# Patient Record
Sex: FEMALE | Race: WHITE
[De-identification: names, ages, dates, MRNs, and addresses within clinical notes are randomized per-mention and may not be internally consistent; named-entity substitution may affect disease eponyms.]

---

## 2017-01-24 ENCOUNTER — HOSPITAL ENCOUNTER (OUTPATIENT)
Dept: HOSPITAL 61 - EKG | Age: 27
Discharge: HOME | End: 2017-01-24
Attending: OBSTETRICS & GYNECOLOGY
Payer: COMMERCIAL

## 2017-01-24 DIAGNOSIS — R00.2: Primary | ICD-10-CM

## 2017-01-24 PROCEDURE — 93005 ELECTROCARDIOGRAM TRACING: CPT

## 2017-01-24 NOTE — EKG
[facility]

              [address] [place] [address]

Test Date:    2017               Test Time:    12:43:20

Pat Name:     WIL ZUNIGA              Department:   

Patient ID:   [id number]           Room:          

Gender:       F                        Technician:   NOHELIA

:          1990               Requested By: CARMITA GOODE

Order Number: [id number]            Reading MD:   Jean Claude Gonsalves

                                 Measurements

Intervals                              Axis          

Rate:         78                       P:            38

NV:           178                      QRS:          70

QRSD:         76                       T:            18

QT:           362                                    

QTc:          416                                    

                           Interpretive Statements

SINUS RHYTHM



Electronically Signed On 2017 13:06:14 CST by Jean Claude Gonsalves

## 2017-02-22 ENCOUNTER — HOSPITAL ENCOUNTER (OUTPATIENT)
Dept: HOSPITAL 61 - ECHO | Age: 27
Discharge: HOME | End: 2017-02-22
Attending: INTERNAL MEDICINE
Payer: COMMERCIAL

## 2017-02-22 DIAGNOSIS — R00.2: ICD-10-CM

## 2017-02-22 DIAGNOSIS — R07.9: Primary | ICD-10-CM

## 2017-02-22 DIAGNOSIS — R06.02: ICD-10-CM

## 2017-02-22 DIAGNOSIS — Z3A.15: ICD-10-CM

## 2017-02-22 PROCEDURE — 93225 XTRNL ECG REC<48 HRS REC: CPT

## 2017-02-22 PROCEDURE — 93306 TTE W/DOPPLER COMPLETE: CPT

## 2017-02-22 PROCEDURE — 93226 XTRNL ECG REC<48 HR SCAN A/R: CPT

## 2017-02-23 NOTE — CARD
--------------- APPROVED REPORT --------------





EXAM: Two-dimensional and M-mode echocardiogram with Doppler and color Doppler.



Other Information 

Quality : GoodHR: 81bpm

Rhythm : NSR



INDICATION

Palpitations 

Dyspnea 



2D DIMENSIONS 

RVDd3.2 (2.9-3.5cm)Left Atrium(2D)3.2 (1.6-4.0cm)

IVSd0.8 (0.7-1.1cm)Aortic Root(2D)2.4 (2.0-3.7cm)

LVDd5.0 (3.9-5.9cm)LVOT Diameter2.0 (1.8-2.4cm)

PWd0.9 (0.7-1.1cm)LVDs3.5 (2.5-4.0cm)

FS (%) 30.7 %SV69.0 ml

LVEF(%)58.1 (>50%)



Aortic Valve

AoV Peak Kunal.141.0cm/sAoV VTI26.9cm

AO Peak GR.7.9mmHgLVOT Peak Kunal.92.7cm/s

LVOT  VTI 19.29cmAO Mean GR.5mmHg

QASIM (VMAX)2.67zq5DMB   (VTI)2.31cm2



Mitral Valve

MV E Ixwybnno04.2cm/sMV DECEL [id number]

MV A Lgyjoslr08.8cm/sMV E Mean Gr.2mmHg

MV PDW01tiX/A  Ratio1.8

MV A Vuwasjtc747szUBC (PHT)3.36cm2



TDI

E/Lateral E'4.9E/Medial E'7.4



Pulmonary Valve

PV Peak Hzvengcu411.7cm/sPV Peak Grad.8mmHg

RVOT VTI15.0cm



Tricuspid Valve

TR P. Hauapprj732rc/sRAP SSMJTZIV1wsPg

TR Peak Gr.97gnBsVLMY24mdNm



Pulmonary Vein

S1 Jjqqhinc65.2cm/sD2 Msojyvtw21.6cm/s

PVa sklxptop331pofq



 LEFT VENTRICLE 

The left ventricle is normal size. There is normal left ventricular wall thickness. Left ventricle sy
stolic function is normal. The Ejection Fraction is 55-60%. There is normal LV segmental wall motion.
 The left ventricular diastolic function and filling is normal .



 RIGHT VENTRICLE 

The right ventricle is normal size. There is normal right ventricular wall thickness. The right ventr
icular systolic function is normal.



 ATRIA 

The left atrium size is normal. The right atrium size is normal. The interatrial septum is intact wit
h no evidence for an atrial septal defect or patent foramen ovale as noted on 2-D or Doppler imaging.




 AORTIC VALVE 

The aortic valve is normal in structure and function. The aortic valve is trileaflet. Doppler and Col
or Flow revealed no significant aortic regurgitation. There is no significant aortic valvular stenosi
s.



 MITRAL VALVE 

The mitral valve is normal in structure and function. There is no evidence of mitral valve prolapse. 
There is no mitral valve stenosis. Doppler and Color Flow revealed trace mitral regurgitation.



 TRICUSPID VALVE 

The tricuspid valve is normal in structure. Doppler and Color Flow revealed mild tricuspid regurgitat
ion. There is no pulmonary hypertension. The PA pressure was estimated at 29 mmHg. There is no tricus
pid valve stenosis.



 PULMONIC VALVE 

The pulmonary valve is normal in structure. Doppler and Color Flow revealed trace pulmonic valvular r
egurgitation. There is no pulmonic valvular stenosis.



 GREAT VESSELS 

The aortic root is normal in size. The ascending aorta is normal in size. Normal pulmonary venous javi
w (Doppler). The IVC is dilated and collapses >50% with inspiration.



 PERICARDIAL EFFUSION 

There is no evidence of significant pericardial effusion.



Critical Notification

Critical Value: No



<Conclusion>

The left ventricle is normal size.

There is normal left ventricular wall thickness.

Left ventricle systolic function is normal.

The Ejection Fraction is 55-60%.

The left ventricular diastolic function and filling is normal .

There is no evidence of significant pericardial effusion.

There is no mitral valve stenosis.

Doppler and Color Flow revealed trace mitral regurgitation.

The left atrium is of a normal size

The righr ventricle is of a normal size with normal systolic function

Doppler and Color Flow revealed mild tricuspid regurgitation.

There is no pulmonary hypertension.

The PA pressure was estimated at 29 mmHg.

Doppler and Color Flow revealed trace pulmonic valvular regurgitation.

## 2017-02-23 NOTE — EKG
[facility]

                    [address] [place] [address]

Test Date:    2017               Test Time:    10:46:00

Pat Name:     WIL ZUNIGA              Department:   

Patient ID:   [id number]           Room:          

Gender:       F                        Technician:   

:          1990               Requested By: MASHA GREENE

Order Number: [id number]            Reading MD:     

                           Interpretive Statements

## 2017-10-05 ENCOUNTER — HOSPITAL ENCOUNTER (EMERGENCY)
Dept: HOSPITAL 63 - ER | Age: 27
Discharge: HOME | End: 2017-10-05
Payer: COMMERCIAL

## 2017-10-05 VITALS — SYSTOLIC BLOOD PRESSURE: 105 MMHG | DIASTOLIC BLOOD PRESSURE: 59 MMHG

## 2017-10-05 DIAGNOSIS — Z98.890: ICD-10-CM

## 2017-10-05 DIAGNOSIS — D64.9: ICD-10-CM

## 2017-10-05 DIAGNOSIS — N93.8: Primary | ICD-10-CM

## 2017-10-05 DIAGNOSIS — F41.9: ICD-10-CM

## 2017-10-05 LAB
ALBUMIN SERPL-MCNC: 3.4 G/DL (ref 3.4–5)
ALBUMIN/GLOB SERPL: 1.1 {RATIO} (ref 1–1.7)
ALP SERPL-CCNC: 51 U/L (ref 46–116)
ALT SERPL-CCNC: 29 U/L (ref 14–59)
ANION GAP SERPL CALC-SCNC: 5 MMOL/L (ref 6–14)
AST SERPL-CCNC: 18 U/L (ref 15–37)
BASOPHILS # BLD AUTO: 0 X10^3/UL (ref 0–0.2)
BASOPHILS NFR BLD: 1 % (ref 0–3)
BILIRUB SERPL-MCNC: 0.3 MG/DL (ref 0.2–1)
BUN/CREAT SERPL: 14 (ref 6–20)
CA-I SERPL ISE-MCNC: 11 MG/DL (ref 7–20)
CALCIUM SERPL-MCNC: 8.8 MG/DL (ref 8.5–10.1)
CHLORIDE SERPL-SCNC: 106 MMOL/L (ref 98–107)
CO2 SERPL-SCNC: 30 MMOL/L (ref 21–32)
CREAT SERPL-MCNC: 0.8 MG/DL (ref 0.6–1)
EOSINOPHIL NFR BLD: 0.2 X10^3/UL (ref 0–0.7)
EOSINOPHIL NFR BLD: 4 % (ref 0–3)
ERYTHROCYTE [DISTWIDTH] IN BLOOD BY AUTOMATED COUNT: 13.2 % (ref 11.5–14.5)
GFR SERPLBLD BASED ON 1.73 SQ M-ARVRAT: 84.8 ML/MIN
GLOBULIN SER-MCNC: 3.1 G/DL (ref 2.2–3.8)
GLUCOSE SERPL-MCNC: 80 MG/DL (ref 70–99)
HCT VFR BLD CALC: 36.3 % (ref 36–47)
HGB BLD-MCNC: 12.4 G/DL (ref 12–15.5)
LYMPHOCYTES # BLD: 1.9 X10^3/UL (ref 1–4.8)
LYMPHOCYTES NFR BLD AUTO: 32 % (ref 24–48)
MCH RBC QN AUTO: 31 PG (ref 25–35)
MCHC RBC AUTO-ENTMCNC: 34 G/DL (ref 31–37)
MCV RBC AUTO: 90 FL (ref 79–100)
MONO #: 0.4 X10^3/UL (ref 0–1.1)
MONOCYTES NFR BLD: 7 % (ref 0–9)
NEUT #: 3.3 X10^3UL (ref 1.8–7.7)
NEUTROPHILS NFR BLD AUTO: 57 % (ref 31–73)
PLATELET # BLD AUTO: 195 X10^3/UL (ref 140–400)
POTASSIUM SERPL-SCNC: 3.5 MMOL/L (ref 3.5–5.1)
PROT SERPL-MCNC: 6.5 G/DL (ref 6.4–8.2)
RBC # BLD AUTO: 4.03 X10^6/UL (ref 3.5–5.4)
SODIUM SERPL-SCNC: 141 MMOL/L (ref 136–145)
WBC # BLD AUTO: 5.9 X10^3/UL (ref 4–11)

## 2017-10-05 PROCEDURE — 87591 N.GONORRHOEAE DNA AMP PROB: CPT

## 2017-10-05 PROCEDURE — 86850 RBC ANTIBODY SCREEN: CPT

## 2017-10-05 PROCEDURE — 96361 HYDRATE IV INFUSION ADD-ON: CPT

## 2017-10-05 PROCEDURE — 96372 THER/PROPH/DIAG INJ SC/IM: CPT

## 2017-10-05 PROCEDURE — 96374 THER/PROPH/DIAG INJ IV PUSH: CPT

## 2017-10-05 PROCEDURE — 99285 EMERGENCY DEPT VISIT HI MDM: CPT

## 2017-10-05 PROCEDURE — 86901 BLOOD TYPING SEROLOGIC RH(D): CPT

## 2017-10-05 PROCEDURE — 96375 TX/PRO/DX INJ NEW DRUG ADDON: CPT

## 2017-10-05 PROCEDURE — 36415 COLL VENOUS BLD VENIPUNCTURE: CPT

## 2017-10-05 PROCEDURE — 80053 COMPREHEN METABOLIC PANEL: CPT

## 2017-10-05 PROCEDURE — 86900 BLOOD TYPING SEROLOGIC ABO: CPT

## 2017-10-05 PROCEDURE — 87491 CHLMYD TRACH DNA AMP PROBE: CPT

## 2017-10-05 PROCEDURE — 85025 COMPLETE CBC W/AUTO DIFF WBC: CPT

## 2017-10-05 PROCEDURE — 76856 US EXAM PELVIC COMPLETE: CPT

## 2017-10-05 PROCEDURE — 76830 TRANSVAGINAL US NON-OB: CPT

## 2017-10-05 RX ADMIN — MORPHINE SULFATE PRN MG: 4 INJECTION, SOLUTION INTRAMUSCULAR; INTRAVENOUS at 16:35

## 2017-10-05 RX ADMIN — MORPHINE SULFATE PRN MG: 4 INJECTION, SOLUTION INTRAMUSCULAR; INTRAVENOUS at 17:02

## 2017-10-05 NOTE — ED.ADGEN
Past History


Past Medical History:  No Pertinent History


Past Medical History


Asthma as a child, anxiety, restless leg syndrome


Past Surgical History:  , Tonsillectomy


Past Surgical History


 2, tonsillectomy with adenoidectomy, ORIF arm fracture, oral surgery


Smoking:  Quit Greater Than 1 Year


Alcohol Use:  None


Drug Use:  None





Adult General


HPI


HPI





This is a pleasant 27-year-old female who presents emergency Department with a 

chief complaint of vaginal bleeding. The patient is 9 weeks postpartum from a C-

section. Patient states she had her last follow-up exam with her OB/GYN 2 weeks 

ago and was allowed to return back to normal activities. The patient presents 

that she had sex 5 days ago for the first time. The patient reports that she's 

had vaginal bleeding greater than 2 pads per hour for the past 3 days. The 

patient reports that she is feeling weak and tired easily. The patient reports 

that the infant is sleeping well and that that is giving her reasonable sleep. 

She does understand why she is so tired. The patient reports that she is a C-

section on 2017. Dr. márquez is her OB/GYN. The patient taking Motrin 

and Tylenol as needed for pain since she was taken off of the Percocet that she 

is initially on for her . The patient also takes Zoloft for anxiety 

and referral for progressive leg syndrome. The patient reports feeling more 

anxious the delivery of her child. The patient denies suicidal or homicidal 

ideation.





Review of Systems


Review of Systems





Constitutional: Denies fever or chills []


Eyes: Denies change in visual acuity, redness, or eye pain []


HENT: Denies nasal congestion or sore throat []


Respiratory: Denies cough or shortness of breath []


Cardiovascular: No additional information not addressed in HPI []


GI: Denies abdominal pain, nausea, vomiting, bloody stools or diarrhea []


: Denies dysuria or hematuria []


Musculoskeletal: Denies back pain or joint pain []


Integument: Denies rash or skin lesions []


Neurologic: Denies headache, focal weakness or sensory changes []


Endocrine: Denies polyuria or polydipsia []





Current Medications


Current Medications





Current Medications








 Medications


  (Trade)  Dose


 Ordered  Sig/Ming  Start Time


 Stop Time Status Last Admin


Dose Admin


 


 Ketorolac


 Tromethamine


  (Toradol)  30 mg  1X  ONCE  10/5/17 15:15


 10/5/17 15:16 DC 10/5/17 15:14


30 MG


 


 Morphine Sulfate


  (Morphine 4mg


 Syringe)  4 mg  PRN Q15MIN  PRN  10/5/17 16:30


 10/5/17 17:25 DC 10/5/17 17:02


4 MG


 


 Sodium Chloride  1,000 ml @ 


 1,000 mls/hr  1X  ONCE  10/5/17 15:15


 10/5/17 16:14 DC 10/5/17 15:13


1,000 MLS/HR











Allergies


Allergies





Allergies








Coded Allergies Type Severity Reaction Last Updated Verified


 


  No Known Drug Allergies    16 No











Physical Exam


Physical Exam





Constitutional: Well developed, well nourished, no acute distress, non-toxic 

appearance. []


HENT: Normocephalic, atraumatic, bilateral external ears normal, oropharynx 

moist, no oral exudates, nose normal. []


Eyes: PERRLA, EOMI, conjunctiva normal, no discharge. [] 


Neck: Normal range of motion, no tenderness, supple, no stridor. [] 


Cardiovascular:Heart rate regular rhythm, no murmur []


Lungs & Thorax:  Bilateral breath sounds clear to auscultation []


Abdomen: Bowel sounds normal, soft, no tenderness, no masses, no pulsatile 

masses. [] 


Skin: Warm, dry, no erythema, no rash. [] 


Back: No tenderness, no CVA tenderness. 


 exam: performed with the nurse as my chaperone, normal external genitalia, 

minimal vaginal bleeding less than that expected for a period, no blood clots 

in vagina, no cervical motion tenderness, no adenexal tenderness, no adenexal 

mass, no uterine mass, no vaginal discharge[] 


Extremities: No tenderness, no cyanosis, no clubbing, ROM intact, no edema. [] 


Neurologic: Alert and oriented X 3, normal motor function, normal sensory 

function, no focal deficits noted. []


Psychologic: Affect normal, judgement normal, mood normal. []





Current Patient Data


Vital Signs





 Vital Signs








  Date Time  Temp Pulse Resp B/P (MAP) Pulse Ox O2 Delivery O2 Flow Rate FiO2


 


10/5/17 17:22  82 18 105/59 (74) 99 Room Air  


 


10/5/17 14:30 98.2       








Lab Results





 Laboratory Tests








Test


  10/5/17


15:00


 


White Blood Count


  5.9 x10^3/uL


(4.0-11.0)


 


Red Blood Count


  4.03 x10^6/uL


(3.50-5.40)


 


Hemoglobin


  12.4 g/dL


(12.0-15.5)


 


Hematocrit


  36.3 %


(36.0-47.0)


 


Mean Corpuscular Volume


  90 fL ()


 


 


Mean Corpuscular Hemoglobin 31 pg (25-35)  


 


Mean Corpuscular Hemoglobin


Concent 34 g/dL


(31-37)


 


Red Cell Distribution Width


  13.2 %


(11.5-14.5)


 


Platelet Count


  195 x10^3/uL


(140-400)


 


Neutrophils (%) (Auto) 57 % (31-73)  


 


Lymphocytes (%) (Auto) 32 % (24-48)  


 


Monocytes (%) (Auto) 7 % (0-9)  


 


Eosinophils (%) (Auto) 4 % (0-3)  H


 


Basophils (%) (Auto) 1 % (0-3)  


 


Neutrophils # (Auto)


  3.3 x10^3uL


(1.8-7.7)


 


Lymphocytes # (Auto)


  1.9 x10^3/uL


(1.0-4.8)


 


Monocytes # (Auto)


  0.4 x10^3/uL


(0.0-1.1)


 


Eosinophils # (Auto)


  0.2 x10^3/uL


(0.0-0.7)


 


Basophils # (Auto)


  0.0 x10^3/uL


(0.0-0.2)


 


Sodium Level


  141 mmol/L


(136-145)


 


Potassium Level


  3.5 mmol/L


(3.5-5.1)


 


Chloride Level


  106 mmol/L


()


 


Carbon Dioxide Level


  30 mmol/L


(21-32)


 


Anion Gap 5 (6-14)  L


 


Blood Urea Nitrogen


  11 mg/dL


(7-20)


 


Creatinine


  0.8 mg/dL


(0.6-1.0)


 


Estimated GFR


(Cockcroft-Gault) 84.8  


 


 


BUN/Creatinine Ratio 14 (6-20)  


 


Glucose Level


  80 mg/dL


(70-99)


 


Calcium Level


  8.8 mg/dL


(8.5-10.1)


 


Total Bilirubin


  0.3 mg/dL


(0.2-1.0)


 


Aspartate Amino Transferase


(AST) 18 U/L (15-37)


 


 


Alanine Aminotransferase (ALT)


  29 U/L (14-59)


 


 


Alkaline Phosphatase


  51 U/L


()


 


Total Protein


  6.5 g/dL


(6.4-8.2)


 


Albumin


  3.4 g/dL


(3.4-5.0)


 


Albumin/Globulin Ratio 1.1 (1.0-1.7)  








Microbiology


10/5/17 Wet Prep - Final, Complete


          





 Laboratory Tests








Test


  10/5/17


15:00


 


White Blood Count


  5.9 x10^3/uL


(4.0-11.0)


 


Red Blood Count


  4.03 x10^6/uL


(3.50-5.40)


 


Hemoglobin


  12.4 g/dL


(12.0-15.5)


 


Hematocrit


  36.3 %


(36.0-47.0)


 


Mean Corpuscular Volume


  90 fL ()


 


 


Mean Corpuscular Hemoglobin 31 pg (25-35)  


 


Mean Corpuscular Hemoglobin


Concent 34 g/dL


(31-37)


 


Red Cell Distribution Width


  13.2 %


(11.5-14.5)


 


Platelet Count


  195 x10^3/uL


(140-400)


 


Neutrophils (%) (Auto) 57 % (31-73)  


 


Lymphocytes (%) (Auto) 32 % (24-48)  


 


Monocytes (%) (Auto) 7 % (0-9)  


 


Eosinophils (%) (Auto) 4 % (0-3)  H


 


Basophils (%) (Auto) 1 % (0-3)  


 


Neutrophils # (Auto)


  3.3 x10^3uL


(1.8-7.7)


 


Lymphocytes # (Auto)


  1.9 x10^3/uL


(1.0-4.8)


 


Monocytes # (Auto)


  0.4 x10^3/uL


(0.0-1.1)


 


Eosinophils # (Auto)


  0.2 x10^3/uL


(0.0-0.7)


 


Basophils # (Auto)


  0.0 x10^3/uL


(0.0-0.2)


 


Sodium Level


  141 mmol/L


(136-145)


 


Potassium Level


  3.5 mmol/L


(3.5-5.1)


 


Chloride Level


  106 mmol/L


()


 


Carbon Dioxide Level


  30 mmol/L


(21-32)


 


Anion Gap 5 (6-14)  L


 


Blood Urea Nitrogen


  11 mg/dL


(7-20)


 


Creatinine


  0.8 mg/dL


(0.6-1.0)


 


Estimated GFR


(Cockcroft-Gault) 84.8  


 


 


BUN/Creatinine Ratio 14 (6-20)  


 


Glucose Level


  80 mg/dL


(70-99)


 


Calcium Level


  8.8 mg/dL


(8.5-10.1)


 


Total Bilirubin


  0.3 mg/dL


(0.2-1.0)


 


Aspartate Amino Transferase


(AST) 18 U/L (15-37)


 


 


Alanine Aminotransferase (ALT)


  29 U/L (14-59)


 


 


Alkaline Phosphatase


  51 U/L


()


 


Total Protein


  6.5 g/dL


(6.4-8.2)


 


Albumin


  3.4 g/dL


(3.4-5.0)


 


Albumin/Globulin Ratio 1.1 (1.0-1.7)  








Microbiology


10/5/17 Wet Prep - Final, Complete





EKG


EKG


[]





Radiology/Procedures


Radiology/Procedures


[facility] [address], [place] [address] [phone]


 


 IMAGING REPORT





 Signed





PATIENT: KRISH GARRETT ACCOUNT: [id number] MRN#: [id number]


: 1990 LOCATION: ER AGE: 27


SEX: F EXAM DT: 10/05/17 ACCESSION#: [id number]


STATUS: REG ER ORD. PHYSICIAN: MISHA MEJIA MD 


REASON: bleeding 9 weeks after c section concern for retained products of


PROCEDURE: US PELVIS W/TV





Pelvic ultrasound, 10/5/2017:





History: Postpartum bleeding





Transabdominal and transvaginal scans were obtained. The uterus is


retroverted. It is within normal limits in size. The central uterine echo


complex measures only 6 mm in greatest AP dimension. No significant retained


products of conception are evident. There is a tiny amount of fluid in the


central uterine cavity. The ovaries are within normal limits in size. They


contain small follicular cysts. No adnexal mass is seen. There is a small to


moderate amount of free fluid in the pelvis.





IMPRESSION:


1. Tiny amount of fluid in the central uterine cavity.


2. Small to moderate volume of free fluid in the pelvis.














DICTATED AND SIGNED BY:     MORITZ,RICK S MD


DATE:     10/05/17 4642





CC: ENRIQUETA ABDUL MD; MISHA MEJIA MD ~


[]





Course & Med Decision Making


Course & Med Decision Making


Pertinent Labs and Imaging studies reviewed. (See chart for details)





This is a pleasant 27-year-old female who presents immersed department with 

ongoing vaginal bleeding following a  delivery 9 weeks ago. The patient

's bleeding on exam was very minimal. The patient had minimal bleeding 

throughout her emergency department stay. Patient's hemogram agree stable 

throughout her emergency department stay. The patient's ultrasound did not show 

any signs for retained products of conception or other abnormalities. I 

discussed the patient the need for close follow-up patient verbalizes 

understanding and agrees to plan for outpatient follow-up. I recommended over-

the-counter iron tablets or prenatal vitamins to help the patient maintained 

her iron levels. She verbalized understanding and agreed with this plan.[]





Final Impression


Final Impression


Dysfunctional uterine bleeding, mild anemia[]


Problems:  





Dragon Disclaimer


Dragon Disclaimer


This electronic medical record was generated, in whole or in part, using a 

voice recognition dictation system.





Departure


Departure:


Impression:  


 Primary Impression:  


 Dysfunctional uterine bleeding


 Additional Impression:  


 Anemia


Disposition:  01 HOME, SELF-CARE


Condition:  GOOD


Patient Instructions:  Uterine Bleeding, Dysfunctional





Additional Instructions:  


The patient is referred back to Dr. Feliciano for follow-up.











MISHA MEJIA MD Oct 5, 2017 14:46

## 2017-10-05 NOTE — RAD
Pelvic ultrasound, 10/5/2017:



History: Postpartum bleeding



Transabdominal and transvaginal scans were obtained. The uterus is

retroverted. It is within normal limits in size. The central uterine echo

complex measures only 6 mm in greatest AP dimension. No significant retained

products of conception are evident. There is a tiny amount of fluid in the

central uterine cavity. The ovaries are within normal limits in size. They

contain small follicular cysts. No adnexal mass is seen. There is a small to

moderate amount of free fluid in the pelvis.



IMPRESSION:

1. Tiny amount of fluid in the central uterine cavity.

2. Small to moderate volume of free fluid in the pelvis.

## 2017-12-09 ENCOUNTER — HOSPITAL ENCOUNTER (EMERGENCY)
Dept: HOSPITAL 63 - ER | Age: 27
Discharge: HOME | End: 2017-12-09
Payer: COMMERCIAL

## 2017-12-09 VITALS — WEIGHT: 151.24 LBS | HEIGHT: 63 IN | BODY MASS INDEX: 26.8 KG/M2

## 2017-12-09 VITALS — SYSTOLIC BLOOD PRESSURE: 105 MMHG | DIASTOLIC BLOOD PRESSURE: 59 MMHG

## 2017-12-09 DIAGNOSIS — Z53.21: ICD-10-CM

## 2017-12-09 DIAGNOSIS — R10.9: Primary | ICD-10-CM

## 2018-03-18 ENCOUNTER — HOSPITAL ENCOUNTER (EMERGENCY)
Dept: HOSPITAL 63 - ER | Age: 28
Discharge: HOME | End: 2018-03-18
Payer: COMMERCIAL

## 2018-03-18 VITALS — DIASTOLIC BLOOD PRESSURE: 76 MMHG | SYSTOLIC BLOOD PRESSURE: 138 MMHG

## 2018-03-18 DIAGNOSIS — Z87.891: ICD-10-CM

## 2018-03-18 DIAGNOSIS — N76.0: ICD-10-CM

## 2018-03-18 DIAGNOSIS — Z87.440: ICD-10-CM

## 2018-03-18 DIAGNOSIS — B96.89: ICD-10-CM

## 2018-03-18 DIAGNOSIS — M54.5: Primary | ICD-10-CM

## 2018-03-18 LAB
APTT PPP: YELLOW S
BACTERIA #/AREA URNS HPF: (no result) /HPF
BILIRUB UR QL STRIP: (no result)
FIBRINOGEN PPP-MCNC: CLEAR MG/DL
GLUCOSE UR STRIP-MCNC: (no result) MG/DL
NITRITE UR QL STRIP: (no result)
RBC #/AREA URNS HPF: 0 /HPF (ref 0–2)
SP GR UR STRIP: 1.01
SQUAMOUS #/AREA URNS LPF: (no result) /LPF
UROBILINOGEN UR-MCNC: 0.2 MG/DL
WBC #/AREA URNS HPF: (no result) /HPF (ref 0–4)

## 2018-03-18 PROCEDURE — 81001 URINALYSIS AUTO W/SCOPE: CPT

## 2018-03-18 PROCEDURE — 99283 EMERGENCY DEPT VISIT LOW MDM: CPT

## 2018-03-18 PROCEDURE — 81025 URINE PREGNANCY TEST: CPT

## 2018-03-18 NOTE — PHYS DOC
Past History


Past Medical History:  UTI, Other


Past Surgical History:  , Tonsillectomy, Tubal ligation


Smoking:  Quit Greater Than 1 Year


Alcohol Use:  Rarely


Drug Use:  None





Adult General


Chief Complaint


Chief Complaint:  PAIN ON URINATION





[facility]


HPI





Patient is a 27 year old female who presents with pain in the left back left 

hip. She states been hurting for last 3-4 days. She's been trying Tylenol and 

ibuprofen without any relief. She states it hurts more when she tries to bend 

over and  her son. She states when she's had this pain in the past and 

has had a bladder kidney infection. She denies any nausea vomiting diarrhea. 

She denies any abdominal pain.





Review of Systems


Review of Systems





Constitutional: Denies fever or chills []


Eyes: Denies change in visual acuity, redness, or eye pain []


HENT: Denies nasal congestion or sore throat []


Respiratory: Denies cough or shortness of breath []


Cardiovascular: No additional information not addressed in HPI []


GI: Denies abdominal pain, nausea, vomiting, bloody stools or diarrhea []


: Denies dysuria or hematuria []


Musculoskeletal:  Positive for back pain, Denies joint pain []


Integument: Denies rash or skin lesions []


Neurologic: Denies headache, focal weakness or sensory changes []


Endocrine: Denies polyuria or polydipsia []





All other systems were reviewed and found to be within normal limits, except as 

documented in this note.





Allergies


Allergies





Allergies








Coded Allergies Type Severity Reaction Last Updated Verified


 


  No Known Drug Allergies    16 No











Physical Exam


Physical Exam





Constitutional: Well developed, well nourished, no acute distress, non-toxic 

appearance. []


HENT: Normocephalic, atraumatic, bilateral external ears normal, oropharynx 

moist, no oral exudates, nose normal. []


Eyes: PERRLA, EOMI, conjunctiva normal, no discharge. [] 


Neck: Normal range of motion, no tenderness, supple, no stridor. [] 


Cardiovascular:Heart rate regular rhythm, no murmur []


Lungs & Thorax:  Bilateral breath sounds clear to auscultation []


Abdomen: Bowel sounds normal, soft, no tenderness, no masses, no pulsatile 

masses. [] 


Skin: Warm, dry, no erythema, no rash. [] 


Back: Nontender palpation in the left paraspinal area down the left hip, no 

midline tenderness, no pain with range of motion of the left hip, no CVA 

tenderness. [] 


Extremities: No tenderness, no cyanosis, no clubbing, ROM intact, no edema. [] 


Neurologic: Alert and oriented X 3, normal motor function, normal sensory 

function, no focal deficits noted. []


Psychologic: Affect normal, judgement normal, mood normal. []





Current Patient Data


Vital Signs





 Vital Signs








  Date Time  Temp Pulse Resp B/P (MAP) Pulse Ox O2 Delivery O2 Flow Rate FiO2


 


3/18/18 14:15 98.4 89 18  96 Room Air  








Lab Results





 Laboratory Tests








Test


  3/18/18


13:39


 


POC Urine HCG, Qualitative


  hcg negative


(Negative)











EKG


EKG


[]





Radiology/Procedures


Radiology/Procedures


[]


Impressions:


Muscle skeletal pain


Vaginosis





Course & Med Decision Making


Course & Med Decision Making


Pertinent Labs and Imaging studies reviewed. (See chart for details)





UA and UCG negative. We'll treat for muscle skeletal pain with Flexeril. Return 

precautions given. When I was getting ready to discharge the patient she states 

Flexeril makes her too sleepy and recommend something else. She also confirmed 

nurse that she's had bacterial vaginosis and was treated with Keflex and it did 

not help. She is requesting another antibiotic. I spoke with the patient she 

does not want have a pelvic examination is requesting antibiotics. She is also 

requesting Diflucan.





Dragon Disclaimer


Dragon Disclaimer


This electronic medical record was generated, in whole or in part, using a 

voice recognition dictation system.





Departure


Departure:


Impression:  


 Primary Impression:  


 Back pain


Disposition:  01 HOME, SELF-CARE


Condition:  STABLE


Referrals:  


PCP,NO (PCP)


Patient Instructions:  Back Pain, Adult





Additional Instructions:  


Your urinalysis did not show any signs of infection or pregnancy. You likely 

pulled some muscles buttock area around your baby. You can take Valium as 

needed and as instructed.  Valium can make you sleepy so do not drink alcohol 

or drive or taking it if it makes her too sleepy to take it every 8 hours and 

take one tablet before you go to bed. You can also take Tylenol or ibuprofen 

with it. If you develop high fevers, numbness or weakness in your legs, severe 

pain, or other concerns please return back to ER immediately. You should follow-

up with primary care physician within the next 4-5 days. Do not breast-feed 

while taking these medicines. You are also being discharged with Flagyl which 

is an antibiotic for your vaginosis. Do not drink any alcohol or taking this 

medicine as it can make you sick. If you develop a yeast infection from the 

antibiotics she can take one tablet of Diflucan. You should follow back up with 

your OB/GYN physician and primary care physician regarding the above problems.


Scripts


Fluconazole (DIFLUCAN) 150 Mg Tablet


1 TAB PO ONCE, #1 TAB 1 Refill


   Prov: JUDITH STAFFORD MD         3/18/18 


Metronidazole (FLAGYL) 500 Mg Tablet


1 TAB PO BID, #14 TAB


   Prov: JUDITH STAFFORD MD         3/18/18 


Diazepam (VALIUM) 5 Mg Tablet


5 MG PO TID Y for MUSCLE SPASMS, #20 TAB


   Prov: JUDITH STAFFORD MD         3/18/18 


Cyclobenzaprine Hcl (CYCLOBENZAPRINE HCL) 10 Mg Tablet


1 TAB PO TID Y for MUSCLE PAIN, #30 TAB


   Prov: JUDITH STAFFORD MD         3/18/18





Problem Qualifiers








 Primary Impression:  


 Back pain


 Back pain location:  low back pain  Chronicity:  acute  Back pain laterality:  

left  Sciatica presence:  without sciatica  Qualified Codes:  M54.5 - Low back 

pain








JUDITH STAFFORD MD Mar 18, 2018 14:44

## 2018-08-23 ENCOUNTER — HOSPITAL ENCOUNTER (INPATIENT)
Dept: HOSPITAL 63 - ER | Age: 28
LOS: 2 days | Discharge: HOME | DRG: 872 | End: 2018-08-25
Attending: INTERNAL MEDICINE | Admitting: INTERNAL MEDICINE
Payer: COMMERCIAL

## 2018-08-23 VITALS — WEIGHT: 160.13 LBS | BODY MASS INDEX: 29.47 KG/M2 | HEIGHT: 62 IN

## 2018-08-23 DIAGNOSIS — Z98.891: ICD-10-CM

## 2018-08-23 DIAGNOSIS — N17.9: ICD-10-CM

## 2018-08-23 DIAGNOSIS — G56.00: ICD-10-CM

## 2018-08-23 DIAGNOSIS — K21.9: ICD-10-CM

## 2018-08-23 DIAGNOSIS — Z98.51: ICD-10-CM

## 2018-08-23 DIAGNOSIS — A41.9: Primary | ICD-10-CM

## 2018-08-23 DIAGNOSIS — N10: ICD-10-CM

## 2018-08-23 DIAGNOSIS — Z82.49: ICD-10-CM

## 2018-08-23 DIAGNOSIS — F17.210: ICD-10-CM

## 2018-08-23 DIAGNOSIS — Z82.5: ICD-10-CM

## 2018-08-23 DIAGNOSIS — E87.6: ICD-10-CM

## 2018-08-23 LAB
APTT PPP: YELLOW S
BACTERIA #/AREA URNS HPF: (no result) /HPF
BASOPHILS # BLD AUTO: 0 X10^3/UL (ref 0–0.2)
BASOPHILS NFR BLD: 0 % (ref 0–3)
BILIRUB UR QL STRIP: (no result)
EOSINOPHIL NFR BLD: 0 % (ref 0–3)
EOSINOPHIL NFR BLD: 0 X10^3/UL (ref 0–0.7)
ERYTHROCYTE [DISTWIDTH] IN BLOOD BY AUTOMATED COUNT: 14 % (ref 11.5–14.5)
FIBRINOGEN PPP-MCNC: (no result) MG/DL
GLUCOSE UR STRIP-MCNC: (no result) MG/DL
HCT VFR BLD CALC: 42.3 % (ref 36–47)
HGB BLD-MCNC: 14.1 G/DL (ref 12–15.5)
LYMPHOCYTES # BLD: 0.3 X10^3/UL (ref 1–4.8)
LYMPHOCYTES NFR BLD AUTO: 3 % (ref 24–48)
MCH RBC QN AUTO: 30 PG (ref 25–35)
MCHC RBC AUTO-ENTMCNC: 33 G/DL (ref 31–37)
MCV RBC AUTO: 90 FL (ref 79–100)
MONO #: 0.7 X10^3/UL (ref 0–1.1)
MONOCYTES NFR BLD: 6 % (ref 0–9)
NEUT #: 12.3 X10^3UL (ref 1.8–7.7)
NEUTROPHILS NFR BLD AUTO: 92 % (ref 31–73)
NITRITE UR QL STRIP: (no result)
PLATELET # BLD AUTO: 203 X10^3/UL (ref 140–400)
RBC # BLD AUTO: 4.72 X10^6/UL (ref 3.5–5.4)
RBC #/AREA URNS HPF: (no result) /HPF (ref 0–2)
SP GR UR STRIP: 1.02
SQUAMOUS #/AREA URNS LPF: (no result) /LPF
U PREG PATIENT: NEGATIVE
UROBILINOGEN UR-MCNC: 1 MG/DL
WBC # BLD AUTO: 13.4 X10^3/UL (ref 4–11)
WBC #/AREA URNS HPF: (no result) /HPF (ref 0–4)

## 2018-08-23 PROCEDURE — 81001 URINALYSIS AUTO W/SCOPE: CPT

## 2018-08-23 PROCEDURE — 36415 COLL VENOUS BLD VENIPUNCTURE: CPT

## 2018-08-23 PROCEDURE — 80053 COMPREHEN METABOLIC PANEL: CPT

## 2018-08-23 PROCEDURE — 99406 BEHAV CHNG SMOKING 3-10 MIN: CPT

## 2018-08-23 PROCEDURE — 83605 ASSAY OF LACTIC ACID: CPT

## 2018-08-23 PROCEDURE — 96361 HYDRATE IV INFUSION ADD-ON: CPT

## 2018-08-23 PROCEDURE — 74176 CT ABD & PELVIS W/O CONTRAST: CPT

## 2018-08-23 PROCEDURE — 81025 URINE PREGNANCY TEST: CPT

## 2018-08-23 PROCEDURE — 96374 THER/PROPH/DIAG INJ IV PUSH: CPT

## 2018-08-23 PROCEDURE — 87086 URINE CULTURE/COLONY COUNT: CPT

## 2018-08-23 PROCEDURE — 87040 BLOOD CULTURE FOR BACTERIA: CPT

## 2018-08-23 PROCEDURE — 85027 COMPLETE CBC AUTOMATED: CPT

## 2018-08-23 PROCEDURE — 87186 SC STD MICRODIL/AGAR DIL: CPT

## 2018-08-23 PROCEDURE — 85025 COMPLETE CBC W/AUTO DIFF WBC: CPT

## 2018-08-24 VITALS — DIASTOLIC BLOOD PRESSURE: 72 MMHG | SYSTOLIC BLOOD PRESSURE: 115 MMHG

## 2018-08-24 VITALS — SYSTOLIC BLOOD PRESSURE: 110 MMHG | DIASTOLIC BLOOD PRESSURE: 70 MMHG

## 2018-08-24 VITALS — SYSTOLIC BLOOD PRESSURE: 108 MMHG | DIASTOLIC BLOOD PRESSURE: 65 MMHG

## 2018-08-24 VITALS — SYSTOLIC BLOOD PRESSURE: 105 MMHG | DIASTOLIC BLOOD PRESSURE: 63 MMHG

## 2018-08-24 VITALS — DIASTOLIC BLOOD PRESSURE: 73 MMHG | SYSTOLIC BLOOD PRESSURE: 114 MMHG

## 2018-08-24 LAB
ALBUMIN SERPL-MCNC: 3.4 G/DL (ref 3.4–5)
ALBUMIN/GLOB SERPL: 0.8 {RATIO} (ref 1–1.7)
ALP SERPL-CCNC: 80 U/L (ref 46–116)
ALT SERPL-CCNC: 15 U/L (ref 14–59)
ANION GAP SERPL CALC-SCNC: 11 MMOL/L (ref 6–14)
AST SERPL-CCNC: 11 U/L (ref 15–37)
BILIRUB SERPL-MCNC: 0.8 MG/DL (ref 0.2–1)
BUN/CREAT SERPL: 7 (ref 6–20)
CA-I SERPL ISE-MCNC: 6 MG/DL (ref 7–20)
CALCIUM SERPL-MCNC: 8.9 MG/DL (ref 8.5–10.1)
CHLORIDE SERPL-SCNC: 99 MMOL/L (ref 98–107)
CO2 SERPL-SCNC: 25 MMOL/L (ref 21–32)
CREAT SERPL-MCNC: 0.9 MG/DL (ref 0.6–1)
GFR SERPLBLD BASED ON 1.73 SQ M-ARVRAT: 73.5 ML/MIN
GLOBULIN SER-MCNC: 4.2 G/DL (ref 2.2–3.8)
GLUCOSE SERPL-MCNC: 92 MG/DL (ref 70–99)
POTASSIUM SERPL-SCNC: 3.3 MMOL/L (ref 3.5–5.1)
PROT SERPL-MCNC: 7.6 G/DL (ref 6.4–8.2)
SODIUM SERPL-SCNC: 135 MMOL/L (ref 136–145)

## 2018-08-24 RX ADMIN — HYDROMORPHONE HYDROCHLORIDE PRN MG: 2 INJECTION, SOLUTION INTRAMUSCULAR; INTRAVENOUS; SUBCUTANEOUS at 19:36

## 2018-08-24 RX ADMIN — SODIUM CHLORIDE SCH MLS/HR: 0.9 INJECTION, SOLUTION INTRAVENOUS at 03:00

## 2018-08-24 RX ADMIN — HYDROMORPHONE HYDROCHLORIDE PRN MG: 2 INJECTION, SOLUTION INTRAMUSCULAR; INTRAVENOUS; SUBCUTANEOUS at 16:19

## 2018-08-24 RX ADMIN — Medication SCH CAP: at 19:36

## 2018-08-24 RX ADMIN — ONDANSETRON PRN MG: 2 INJECTION INTRAMUSCULAR; INTRAVENOUS at 03:52

## 2018-08-24 RX ADMIN — SODIUM CHLORIDE SCH MLS/HR: 0.9 INJECTION, SOLUTION INTRAVENOUS at 16:09

## 2018-08-24 RX ADMIN — SODIUM CHLORIDE SCH MLS/HR: 0.9 INJECTION, SOLUTION INTRAVENOUS at 22:00

## 2018-08-24 RX ADMIN — HYDROMORPHONE HYDROCHLORIDE PRN MG: 2 INJECTION, SOLUTION INTRAMUSCULAR; INTRAVENOUS; SUBCUTANEOUS at 09:08

## 2018-08-24 RX ADMIN — HYDROMORPHONE HYDROCHLORIDE PRN MG: 2 INJECTION, SOLUTION INTRAMUSCULAR; INTRAVENOUS; SUBCUTANEOUS at 06:17

## 2018-08-24 RX ADMIN — NICOTINE SCH PATCH: 21 PATCH, EXTENDED RELEASE TOPICAL at 09:43

## 2018-08-24 RX ADMIN — SODIUM CHLORIDE SCH MLS/HR: 0.9 INJECTION, SOLUTION INTRAVENOUS at 01:15

## 2018-08-24 RX ADMIN — HYDROMORPHONE HYDROCHLORIDE PRN MG: 2 INJECTION, SOLUTION INTRAMUSCULAR; INTRAVENOUS; SUBCUTANEOUS at 12:59

## 2018-08-24 RX ADMIN — SODIUM CHLORIDE SCH MLS/HR: 0.9 INJECTION, SOLUTION INTRAVENOUS at 01:01

## 2018-08-24 RX ADMIN — PANTOPRAZOLE SODIUM SCH MG: 40 TABLET, DELAYED RELEASE ORAL at 14:25

## 2018-08-24 RX ADMIN — SODIUM CHLORIDE SCH MLS/HR: 0.9 INJECTION, SOLUTION INTRAVENOUS at 08:40

## 2018-08-24 RX ADMIN — ONDANSETRON PRN MG: 2 INJECTION INTRAMUSCULAR; INTRAVENOUS at 12:50

## 2018-08-24 NOTE — PHYS DOC
Past History


Past Medical History:  UTI, Other


Past Surgical History:  , Tonsillectomy, Tubal ligation


Smoking:  Quit Greater Than 1 Year


Alcohol Use:  Rarely


Drug Use:  None





Adult General


Chief Complaint


Chief Complaint:  BACK PAIN OR INJURY





HPI


HPI





Patient is a 28 year old female who presents with complaint of back pain, fever

, and abdominal pain. Patient states her symptoms started yesterday. Patient 

states she started getting soreness and pain in her low back which has 

progressed to mid back pain and muscle aches today. Patient also notes that she 

is having headache and is feeling significantly chills. Patient states that the 

pain radiates from her flanks down into her pelvis. Patient does admit to 

dysuria and increased urinary frequency. Admits to nausea but no vomiting. 

Rates her pain currently as 9 out of 10 on my evaluation. Is taking medications 

for her symptoms.[]





Review of Systems


Review of Systems





Constitutional: Fever, chills[]


Eyes: Denies change in visual acuity, redness, or eye pain []


HENT: Denies nasal congestion or sore throat []


Respiratory: Denies cough or shortness of breath []


Cardiovascular: Denies chest pain or edema[]


GI: Nausea, abdominal pain, denies vomiting, bloody stools or diarrhea []


: Dysuria, bilateral flank pain[]


Musculoskeletal: Myalgias[]


Integument: Denies rash or skin lesions []


Neurologic: Headache, denies focal weakness or sensory changes []





All other systems were reviewed and found to be within normal limits, except as 

documented in this note.





Current Medications


Current Medications





Current Medications








 Medications


  (Trade)  Dose


 Ordered  Sig/[facility]  Start Time


 Stop Time Status Last Admin


Dose Admin


 


 Acetaminophen


  (Tylenol)  1,000 mg  1X  ONCE  18 23:45


 18 23:47 DC 18 01:01


1,000 MG


 


 Ceftriaxone


 Sodium 1 gm/


 Sodium Chloride  50 ml @ 


 100 mls/hr  1X  ONCE  18 00:15


 18 00:44 UNV  





 


 Ceftriaxone Sodium


  (Rocephin)  1 gm  ONCE  ONCE  18 00:30


 18 00:31 DC 18 01:01


1 GM


 


 Sodium Chloride  1,000 ml @ 


 1,500 mls/hr  Q40M  18 00:15


 18 01:14 DC 18 01:01


1,500 MLS/HR











Allergies


Allergies





Allergies








Coded Allergies Type Severity Reaction Last Updated Verified


 


  No Known Drug Allergies    16 No











Physical Exam


Physical Exam





Constitutional: Alert, afebrile, appears ill. []


HENT: Normocephalic, atraumatic, bilateral external ears normal, oropharynx 

moist, no oral exudates, nose normal. []


Eyes: PERRLA, EOMI, conjunctiva normal, no discharge. [] 


Neck: Normal range of motion, no tenderness, supple, no stridor. [] 


Cardiovascular: Tachycardia, regular rhythm, no murmur []


Lungs & Thorax:  Bilateral breath sounds clear to auscultation []


Abdomen: Bowel sounds normal, soft, no tenderness, no masses, no pulsatile 

masses. [] 


Skin: Warm, dry, no erythema, no rash. [] 


Back: Bilateral CVA tenderness, bilateral lower appears nostrils tenderness 

palpation, no midline tenderness. [] 


Extremities: No tenderness, no cyanosis, no clubbing, ROM intact, no edema. [] 


Neurologic: Alert and oriented X 3, normal motor function, normal sensory 

function, no focal deficits noted. []





Current Patient Data


Lab Results





 Laboratory Tests








Test


 18


22:17 18


22:37 18


23:20 18


00:48


 


Urine Collection Type Unknown     


 


Urine Color Yellow     


 


Urine Clarity Hazy     


 


Urine pH 6.0     


 


Urine Specific Gravity 1.020     


 


Urine Protein


 100 mg/dl


(NEG-TRACE) 


 


 





 


Urine Glucose (UA)


 Neg mg/dL


(NEG) 


 


 





 


Urine Ketones (Stick)


 >=160 mg/dL


(NEG) 


 


 





 


Urine Blood Mod (NEG)     


 


Urine Nitrite Pos (NEG)     


 


Urine Bilirubin Neg (NEG)     


 


Urine Urobilinogen Dipstick


 1 mg/dL (0.2


mg/dL) 


 


 





 


Urine Leukocyte Esterase Small (NEG)     


 


Urine RBC


 6-10 /HPF


(0-2) 


 


 





 


Urine WBC


 5-10 /HPF


(0-4) 


 


 





 


Urine Squamous Epithelial


Cells Many /LPF  


 


 


 





 


Urine Bacteria


 Mod /HPF


(0-FEW) 


 


 





 


Urine Pregnancy Test


 Negative (NEG)


 


 


 





 


POC Urine HCG, Qualitative


 


 hcg negative


(Negative) 


 





 


White Blood Count


 


 


 13.4 x10^3/uL


(4.0-11.0)  H 





 


Red Blood Count


 


 


 4.72 x10^6/uL


(3.50-5.40) 





 


Hemoglobin


 


 


 14.1 g/dL


(12.0-15.5) 





 


Hematocrit


 


 


 42.3 %


(36.0-47.0) 





 


Mean Corpuscular Volume


 


 


 90 fL ()


 





 


Mean Corpuscular Hemoglobin   30 pg (25-35)   


 


Mean Corpuscular Hemoglobin


Concent 


 


 33 g/dL


(31-37) 





 


Red Cell Distribution Width


 


 


 14.0 %


(11.5-14.5) 





 


Platelet Count


 


 


 203 x10^3/uL


(140-400) 





 


Neutrophils (%) (Auto)   92 % (31-73)  H 


 


Lymphocytes (%) (Auto)   3 % (24-48)  L 


 


Monocytes (%) (Auto)   6 % (0-9)   


 


Eosinophils (%) (Auto)   0 % (0-3)   


 


Basophils (%) (Auto)   0 % (0-3)   


 


Neutrophils # (Auto)


 


 


 12.3 x10^3uL


(1.8-7.7)  H 





 


Lymphocytes # (Auto)


 


 


 0.3 x10^3/uL


(1.0-4.8)  L 





 


Monocytes # (Auto)


 


 


 0.7 x10^3/uL


(0.0-1.1) 





 


Eosinophils # (Auto)


 


 


 0.0 x10^3/uL


(0.0-0.7) 





 


Basophils # (Auto)


 


 


 0.0 x10^3/uL


(0.0-0.2) 





 


Sodium Level


 


 


 135 mmol/L


(136-145)  L 





 


Potassium Level


 


 


 3.3 mmol/L


(3.5-5.1)  L 





 


Chloride Level


 


 


 99 mmol/L


() 





 


Carbon Dioxide Level


 


 


 25 mmol/L


(21-32) 





 


Anion Gap   11 (6-14)   


 


Blood Urea Nitrogen


 


 


 6 mg/dL (7-20)


L 





 


Creatinine


 


 


 0.9 mg/dL


(0.6-1.0) 





 


Estimated GFR


(Cockcroft-Gault) 


 


 73.5  


 





 


BUN/Creatinine Ratio   7 (6-20)   


 


Glucose Level


 


 


 92 mg/dL


(70-99) 





 


Calcium Level


 


 


 8.9 mg/dL


(8.5-10.1) 





 


Total Bilirubin


 


 


 0.8 mg/dL


(0.2-1.0) 





 


Aspartate Amino Transferase


(AST) 


 


 11 U/L (15-37)


L 





 


Alanine Aminotransferase (ALT)


 


 


 15 U/L (14-59)


 





 


Alkaline Phosphatase


 


 


 80 U/L


() 





 


Total Protein


 


 


 7.6 g/dL


(6.4-8.2) 





 


Albumin


 


 


 3.4 g/dL


(3.4-5.0) 





 


Albumin/Globulin Ratio


 


 


 0.8 (1.0-1.7)


L 





 


Lactic Acid Level


 


 


 


 1.0 mmol/L


(0.4-2.0)











EKG


EKG


Not performed[]





Radiology/Procedures


Radiology/Procedures


Not performed[]





Course & Med Decision Making


Course & Med Decision Making


Pertinent Labs and Imaging studies reviewed. (See chart for details)





Patient's exam and lab work are consistent with acute pyelonephritis. The 

patient does meet sepsis criteria although lactic acid level was normal, this 

patient does not meet severe sepsis criteria. The patient was given IV fluids 

and started on IV Rocephin after blood cultures were drawn reviewed given the 

severity of patient's symptoms I do feel that admission for treatment is 

appropriate. Patient admitted to Dr. Dyson.





Critical care time excluding procedures: 35 minutes





Dragon Disclaimer


Dragon Disclaimer


This electronic medical record was generated, in whole or in part, using a 

voice recognition dictation system.





Departure


Departure:


Impression:  


 Primary Impression:  


 Acute pyelonephritis


 Additional Impression:  


 Sepsis


Disposition:  09 ADMITTED AS INPATIENT


Admitting Physician:  Ellie Dyson


Condition:  GUARDED


Referrals:  


PCP,NO (PCP)





Problem Qualifiers








 Additional Impression:  


 Sepsis


 Sepsis type:  sepsis due to unspecified organism  Qualified Codes:  A41.9 - 

Sepsis, unspecified organism








ELIUD BOB MD Aug 24, 2018 01:46

## 2018-08-24 NOTE — RAD
CT Abdomen and Pelvis without contrast  

 

History: Bilateral flank pain since Monday

 

Technique: Noncontrast CT imaging was performed of the abdomen and pelvis.

Multiplanar images are reviewed.  Exposure: One or more of the following 

individualized dose reduction techniques were utilized for this 

examination:  1. Automated exposure control  2. Adjustment of the mA 

and/or kV according to patient size  3. Use of iterative reconstruction 

technique.

 

Comparison:  September 15, 2016

 

Findings: 

There is no significant abnormality of the limited visualized lung bases.

Accurate evaluation of abdominal visceral organs is limited without 

intravenous contrast.  There is no obvious abnormality of the spleen, 

liver, or pancreas. There is no adrenal nodularity. There is mild strandy 

change of the right perinephric fat and mild right pelvocaliectasis. 

Ureters are poorly distinguished especially in the pelvis, no new calculus

in expected course of the right ureter. There are phleboliths in the 

pelvis bilaterally. No renal calculus. No significantly enlarged nodes are

identified. Accurate evaluation of bowel is limited without oral contrast.

There is minimal nonspecific dependent free fluid in the right pelvis. 

There is at least segmental visualization of the normal caliber appendix, 

difficult to visualize in its entirety. There may be underlying small 

right adnexal cyst.

 

Impression: 

 

1. There is mild right pelvocaliectasis and strandy change of the right 

perinephric fat, no definitive right ureteral calculus and no renal 

calculus. Pyelonephritis is in the differential considerations. Ureters in

the pelvis are poorly distinguished, also phleboliths present.

2. There is minimal nonspecific dependent free fluid in the right pelvis. 

There may be underlying small right adnexal cyst. At least a segment of 

normal appendix is visualized.

 

Electronically signed by: Bay Acosta MD (8/24/2018 11:05 AM) 

[facility]

## 2018-08-24 NOTE — HP
ADMIT DATE:  2018



HISTORY OF PRESENT ILLNESS:  The patient is a 28-year-old  female

patient, who came to the Emergency Room complaining of back pain, fever and

abdominal pain.  Her symptoms started day before admission.  She stated that she

is having severe pain in her low back, which has progressed to mid back pain and

muscle aches.  She also notes that she is having headache and is feeling

significant chills.  She apparently was found to be febrile in the Emergency

Room with a temperature of 102.9.  The pain radiates from her flanks down to her

pelvis.  She does admit to dysuria and increased urinary frequency.  Admits to

nausea, but no vomiting.  She rates her pain as 9/10.  She has tried Tylenol and

ibuprofen without much improvement and therefore she decided to come to the

Emergency Room where she was evaluated and was admitted.  She was found to have

leukocytosis and her urinalysis showed that there was large amount of bacteria. 

It was positive for nitrite and leukocyte esterase.  Her pregnancy test was

negative and she was admitted to be treated with Rocephin 1 gram IV daily

together with IV fluid and pain management.



PAST MEDICAL HISTORY:  Unremarkable.  She has childhood bronchial asthma that

she outgrow and also bilateral recurrent otitis media for which she has

myringotomy tube placement.



PAST SURGICAL HISTORY:  Significant for tonsillectomy, three C-sections, tubal

ligation, bilateral myringotomy, carpal tunnel release and right wrist fracture,

status post closed reduction.



ALLERGIES:  She has no known drug allergies.



MEDICATIONS:  She is on over-the-counter medication in the form of Tylenol,

ibuprofen and Benadryl.



FAMILY HISTORY:  She has two full brothers, both healthy.  One of them is

overweight.  One sister has Graves' disease.  Her father  in his early 50s

because of COPD and alcoholism.  Mother is still alive at age of 53 and is known

to have hypertension.



SOCIAL HISTORY:  She lives with her significant other.  She has a daughter and a

son.  She smokes a pack a day, drinks alcohol occasionally.  Smoking weed about

a year and half ago.  She used to work at [employer] and she is now

unemployed because of severe carpal tunnel syndrome.



REVIEW OF SYSTEMS:  The patient denied any blurring of vision, cataract,

glaucoma or macular degeneration.  Denied any earache, tinnitus or sensorineural

deafness.  Denied any nosebleeds, stuffy nose or postnasal drip.  Denied any

sore throat, sore tongue, toothache, hoarseness of voice or difficulty

swallowing.  Did complain of nausea, but no vomiting.  Denied any hematemesis,

melena or hematochezia.  Did complain of dysuria and frequency, but denied any

hematuria.  Denied any chest pain, shortness of breath, orthopnea, paroxysmal

nocturnal dyspnea.  Did complain of chills and fever.  Apparently, her

temperature was up to 102.9.



PHYSICAL EXAMINATION:

GENERAL:  When I examined her, she looked well and was clearly in no apparent

respiratory distress.  No pallor, jaundice, cyanosis, or thyromegaly.  No

jugular venous distension.  No lower limb edema.

VITAL SIGNS:  Her heart rate was 103, blood pressure was 121/58, temperature was

99.3, respiratory rate was 18 and oxygen saturation was 99% on room air.

HEAD, EYES, EARS, NOSE AND THROAT:  Showed normocephalic, atraumatic.

NECK:  Supple.

HEART:  Showed normal first and second heart sounds with no gallop, rub or

murmur.

CHEST:  Clear to auscultation.  No crepitation or rhonchi.

ABDOMEN:  Distended, soft, nontender.  The patient has no tenderness on her

renal angle.  She did complain of pain in mid back and lower back.  There is no

obvious redness or tenderness to palpation.

NEUROLOGIC:  She is awake, alert, responding appropriately.  All cranial nerves

intact.

EXTREMITIES:  She moves extremities without difficulty.  She ambulates without

assistance or assistive devices.



LABORATORY DATA:  Her lab work this morning showed a white cell count of 13,400,

hemoglobin 14, hematocrit 42, MCV 90 and platelet count of 203,000.  Her

chemistry showed a serum sodium 135, potassium 3.3, chloride 99, bicarbonate 25,

anion gap of 11, BUN 6, creatinine 0.9, estimated GFR was 73 mL per minute.  Her

glucose was 92, lactic acid was only 1, calcium was 8.9.  Total bilirubin, AST,

ALT, alkaline phosphatase were normal.  Total protein 7.6, albumin 3.4.  Her

urinalysis showed the urine was yellow, hazy with a pH of 6, specific gravity of

1.020.  There was large amount of protein.  The urine was negative for glucose,

large amount of ketones, moderate amount of blood, positive for nitrites and

negative with small amount of leukocyte esterase, 6-10 rbc's, 5-10 wbc's,

moderate amount of bacteria.  Her urine pregnancy test was negative.  Her CT

scan of the abdomen and pelvis showed that there is mild right pelvocaliectasis

and stranding changes of the right perinephric fat.  No definitive right

ureteral calculus and no renal calculus.  Perinephritis is in differential

diagnosis.  Ureters in the pelvis are poorly distinguished.  Also phleboliths

present.  There is minimal nonspecific dependent free fluid in the right pelvis.

 There may be underlying small right adnexal cyst at least segment of the normal

appendix is visualized.



SUMMARY:  This is a 28-year-old  female patient, who was admitted with

the back pain radiating to the groin area associated with polyuria and

frequency, fever and leukocytosis.  CT scan suggestive of pyelonephritis.  We

will continue with IV fluid, IV antibiotic in the form of Rocephin and pain

medication.  I will add Flomax as well as Benadryl.  We will repeat all her lab

works tomorrow and decide on further management accordingly.





______________________________

BRIGHT FLORES MD



DR:  REENA/felicitas  JOB#:  [id number] / [id number]

DD:  2018 13:59  DT:  2018 14:28

## 2018-08-25 VITALS — SYSTOLIC BLOOD PRESSURE: 87 MMHG | DIASTOLIC BLOOD PRESSURE: 54 MMHG

## 2018-08-25 LAB
ALBUMIN SERPL-MCNC: 2.4 G/DL (ref 3.4–5)
ALBUMIN/GLOB SERPL: 0.7 {RATIO} (ref 1–1.7)
ALP SERPL-CCNC: 57 U/L (ref 46–116)
ALT SERPL-CCNC: 11 U/L (ref 14–59)
ANION GAP SERPL CALC-SCNC: 7 MMOL/L (ref 6–14)
AST SERPL-CCNC: 8 U/L (ref 15–37)
BILIRUB SERPL-MCNC: 0.3 MG/DL (ref 0.2–1)
BUN/CREAT SERPL: 6 (ref 6–20)
CA-I SERPL ISE-MCNC: 4 MG/DL (ref 7–20)
CALCIUM SERPL-MCNC: 8.2 MG/DL (ref 8.5–10.1)
CHLORIDE SERPL-SCNC: 104 MMOL/L (ref 98–107)
CO2 SERPL-SCNC: 25 MMOL/L (ref 21–32)
CREAT SERPL-MCNC: 0.7 MG/DL (ref 0.6–1)
ERYTHROCYTE [DISTWIDTH] IN BLOOD BY AUTOMATED COUNT: 14 % (ref 11.5–14.5)
GFR SERPLBLD BASED ON 1.73 SQ M-ARVRAT: 98.3 ML/MIN
GLOBULIN SER-MCNC: 3.4 G/DL (ref 2.2–3.8)
GLUCOSE SERPL-MCNC: 87 MG/DL (ref 70–99)
HCT VFR BLD CALC: 33.1 % (ref 36–47)
HGB BLD-MCNC: 11.3 G/DL (ref 12–15.5)
MCH RBC QN AUTO: 31 PG (ref 25–35)
MCHC RBC AUTO-ENTMCNC: 34 G/DL (ref 31–37)
MCV RBC AUTO: 90 FL (ref 79–100)
PLATELET # BLD AUTO: 172 X10^3/UL (ref 140–400)
POTASSIUM SERPL-SCNC: 4 MMOL/L (ref 3.5–5.1)
PROT SERPL-MCNC: 5.8 G/DL (ref 6.4–8.2)
RBC # BLD AUTO: 3.69 X10^6/UL (ref 3.5–5.4)
SODIUM SERPL-SCNC: 136 MMOL/L (ref 136–145)
WBC # BLD AUTO: 11.1 X10^3/UL (ref 4–11)

## 2018-08-25 RX ADMIN — HYDROMORPHONE HYDROCHLORIDE PRN MG: 2 INJECTION, SOLUTION INTRAMUSCULAR; INTRAVENOUS; SUBCUTANEOUS at 01:29

## 2018-08-25 RX ADMIN — NICOTINE SCH PATCH: 21 PATCH, EXTENDED RELEASE TOPICAL at 07:56

## 2018-08-25 RX ADMIN — Medication SCH CAP: at 07:55

## 2018-08-25 RX ADMIN — SODIUM CHLORIDE SCH MLS/HR: 0.9 INJECTION, SOLUTION INTRAVENOUS at 07:55

## 2018-08-25 RX ADMIN — PANTOPRAZOLE SODIUM SCH MG: 40 TABLET, DELAYED RELEASE ORAL at 07:55

## 2018-08-25 RX ADMIN — SODIUM CHLORIDE SCH MLS/HR: 0.9 INJECTION, SOLUTION INTRAVENOUS at 01:30

## 2018-08-25 RX ADMIN — HYDROMORPHONE HYDROCHLORIDE PRN MG: 2 INJECTION, SOLUTION INTRAMUSCULAR; INTRAVENOUS; SUBCUTANEOUS at 07:56

## 2018-08-25 RX ADMIN — HYDROMORPHONE HYDROCHLORIDE PRN MG: 2 INJECTION, SOLUTION INTRAMUSCULAR; INTRAVENOUS; SUBCUTANEOUS at 04:38

## 2018-08-25 RX ADMIN — HYDROMORPHONE HYDROCHLORIDE PRN MG: 2 INJECTION, SOLUTION INTRAMUSCULAR; INTRAVENOUS; SUBCUTANEOUS at 11:05

## 2018-08-25 NOTE — DS
DATE OF DISCHARGE:  08/25/2018



HOSPITAL COURSE:  The patient is a 28-year-old  female patient who came

to the Emergency Room complaining of back pain.  She was found to be febrile in

the Emergency Room with temperature of 102.9.  The pain radiates from her flanks

down to her pelvis and does admit to dysuria and decreased urinary frequency. 

Admits to nausea, but no vomiting.  She came to the Emergency Room and was found

to have leukocytosis and her urinalysis showed that there were large amounts of

bacteria and was positive for nitrite and leukocyte esterase.  Her pregnancy

test was negative.  She was started on 1 gram of IV Rocephin daily as well as IV

fluid and pain management.  We did a CT scan of the abdomen, which basically

showed there is mild right pelvocaliectasis and strandy changes in the right

perinephric fat with no definite right ureteral calculus and no renal calculus. 

The patient was treated for pyelonephritis.  We sent blood for culture and

sensitivity as well as urine for culture and sensitivity and was started on IV

Rocephin, IV fluid, and the pain medication.  She did actually very well.





PHYSICAL EXAMINATION:

GENERAL:  When I examined her this morning, she looked well and was clearly in

no apparent respiratory distress.  No pallor, jaundice, cyanosis, or

thyromegaly.  No jugular venous distension.  No lower limb edema.

VITAL SIGNS:  Her heart rate was 103, blood pressure was 87/54, temperature was

98.3, respiratory rate was 18, and oxygen saturation was 98%.

HEENT:  Examination of the head, eyes, ears, nose and throat showed

normocephalic, atraumatic.

NECK:  Supple.

HEART:  Showed normal first and second heart sounds with no gallop, rub or

murmur.

CHEST:  Clear to auscultation.  No crepitation or rhonchi.

ABDOMEN:  Distended, soft, nontender.  No guarding or rigidity.  No

organomegaly.  Her hernial orifices are intact and bowel sounds normal.

NEUROLOGIC:  She was awake, alert, responding appropriately.  All her cranial

nerves are intact.

EXTREMITIES:  She moves extremities without difficulty.  She ambulates without

assistance or assistive devices.



Her intake was 1600, output was 350.





LABORATORY DATA:  Her lab work this morning showed the white cell count is down

to 11,100, hemoglobin 11, hematocrit 33, MCV 90, and platelet count of 172,000. 

Her chemistry showed serum sodium 136, potassium 4, chloride 104, bicarbonate

25, anion gap of 7, BUN 4, creatinine 0.7, estimated GFR was 98 mL per minute. 

Her glucose was 87, calcium was 8.2.  Total bilirubin, AST, ALT, alkaline

phosphatase was normal.  Total protein was 5.8, albumin 2.4.  Her blood cultures

are so far negative.





DISCHARGE MEDICATIONS:  The patient was discharged home to continue on

cefpodoxime 200 mg twice a day, Percocet 5/325 one to two tablets every 4-6

hours as needed, and Protonix 40 mg once a day.





DISCHARGE INSTRUCTION:  The patient was advised to follow up with her primary

care physician.





FINAL DISCHARGE DIAGNOSES:

1.  Acute pyelonephritis.

2.  Hypokalemia.

3.  Acute kidney injury.

4.  Gastroesophageal reflux disease.





______________________________

BRIGHT FLORES MD



DR:  REENA/felicitas  JOB#:  [id number] / [id number]

DD:  08/25/2018 10:55  DT:  08/25/2018 11:42

## 2019-07-22 ENCOUNTER — HOSPITAL ENCOUNTER (OUTPATIENT)
Dept: HOSPITAL 61 - US | Age: 29
Discharge: HOME | End: 2019-07-22
Attending: OBSTETRICS & GYNECOLOGY
Payer: COMMERCIAL

## 2019-07-22 DIAGNOSIS — N63.20: Primary | ICD-10-CM

## 2019-07-22 PROCEDURE — 76641 ULTRASOUND BREAST COMPLETE: CPT

## 2019-07-23 NOTE — RAD
INDICATION: 28 year-old female presents for imaging evaluation of a 

palpable laterality in the left breast

 

TECHNIQUE: Targeted high resolution sonography of the region of clinical 

concern was performed in the upper outer left breast

 

COMPARISON: None

 

FINDINGS: Sonographic evaluation of the area of focal demonstrates 

unremarkable fibroglandular tissue without evidence for suspicious cystic 

or solid mass.  

 

IMPRESSION: No sonographic evidence of malignancy. 

 

RECOMMENDATION: The patient's focal breast complaint should be further 

managed clinically. There is interval enlargement, imaging and be 

performed again.

 

BI-RADS 1: Negative

 

Electronically signed by: Prashanth Malave MD (7/23/2019 8:30 AM) [facility]

## 2021-12-20 ENCOUNTER — HOSPITAL ENCOUNTER (EMERGENCY)
Dept: HOSPITAL 63 - ER | Age: 31
Discharge: HOME | End: 2021-12-20
Payer: MEDICAID

## 2021-12-20 VITALS — HEIGHT: 62 IN | BODY MASS INDEX: 26.37 KG/M2 | WEIGHT: 143.3 LBS

## 2021-12-20 VITALS — DIASTOLIC BLOOD PRESSURE: 84 MMHG | SYSTOLIC BLOOD PRESSURE: 126 MMHG

## 2021-12-20 DIAGNOSIS — Z98.51: ICD-10-CM

## 2021-12-20 DIAGNOSIS — Z88.0: ICD-10-CM

## 2021-12-20 DIAGNOSIS — Z87.891: ICD-10-CM

## 2021-12-20 DIAGNOSIS — N10: Primary | ICD-10-CM

## 2021-12-20 DIAGNOSIS — Z87.440: ICD-10-CM

## 2021-12-20 LAB
ALBUMIN SERPL-MCNC: 3.1 G/DL (ref 3.4–5)
ALBUMIN/GLOB SERPL: 0.8 {RATIO} (ref 1–1.7)
ALP SERPL-CCNC: 70 U/L (ref 46–116)
ALT SERPL-CCNC: 16 U/L (ref 14–59)
ANION GAP SERPL CALC-SCNC: 8 MMOL/L (ref 6–14)
APTT PPP: YELLOW S
AST SERPL-CCNC: 13 U/L (ref 15–37)
BACTERIA #/AREA URNS HPF: (no result) /HPF
BASOPHILS # BLD AUTO: 0 X10^3/UL (ref 0–0.2)
BASOPHILS NFR BLD: 0 % (ref 0–3)
BILIRUB SERPL-MCNC: 0.6 MG/DL (ref 0.2–1)
BILIRUB UR QL STRIP: (no result)
BUN/CREAT SERPL: 8 (ref 6–20)
CA-I SERPL ISE-MCNC: 6 MG/DL (ref 7–20)
CALCIUM SERPL-MCNC: 8.3 MG/DL (ref 8.5–10.1)
CHLORIDE SERPL-SCNC: 102 MMOL/L (ref 98–107)
CO2 SERPL-SCNC: 25 MMOL/L (ref 21–32)
CREAT SERPL-MCNC: 0.8 MG/DL (ref 0.6–1)
EOSINOPHIL NFR BLD: 0 % (ref 0–3)
EOSINOPHIL NFR BLD: 0 X10^3/UL (ref 0–0.7)
ERYTHROCYTE [DISTWIDTH] IN BLOOD BY AUTOMATED COUNT: 16.5 % (ref 11.5–14.5)
FIBRINOGEN PPP-MCNC: (no result) MG/DL
GFR SERPLBLD BASED ON 1.73 SQ M-ARVRAT: 82.6 ML/MIN
GLOBULIN SER-MCNC: 3.7 G/DL (ref 2.2–3.8)
GLUCOSE SERPL-MCNC: 98 MG/DL (ref 70–99)
GLUCOSE UR STRIP-MCNC: (no result) MG/DL
HCT VFR BLD CALC: 33.8 % (ref 36–47)
HGB BLD-MCNC: 11 G/DL (ref 12–15.5)
LIPASE: 94 U/L (ref 73–393)
LYMPHOCYTES # BLD: 0.6 X10^3/UL (ref 1–4.8)
LYMPHOCYTES NFR BLD AUTO: 8 % (ref 24–48)
MCH RBC QN AUTO: 27 PG (ref 25–35)
MCHC RBC AUTO-ENTMCNC: 32 G/DL (ref 31–37)
MCV RBC AUTO: 82 FL (ref 79–100)
MONO #: 0.6 X10^3/UL (ref 0–1.1)
MONOCYTES NFR BLD: 7 % (ref 0–9)
NEUT #: 7 X10^3UL (ref 1.8–7.7)
NEUTROPHILS NFR BLD AUTO: 85 % (ref 31–73)
NITRITE UR QL STRIP: (no result)
PLATELET # BLD AUTO: 236 X10^3/UL (ref 140–400)
POTASSIUM SERPL-SCNC: 3.6 MMOL/L (ref 3.5–5.1)
PROT SERPL-MCNC: 6.8 G/DL (ref 6.4–8.2)
RBC # BLD AUTO: 4.14 X10^6/UL (ref 3.5–5.4)
RBC #/AREA URNS HPF: (no result) /HPF (ref 0–2)
SODIUM SERPL-SCNC: 135 MMOL/L (ref 136–145)
SP GR UR STRIP: 1.02
SQUAMOUS #/AREA URNS LPF: (no result) /LPF
UROBILINOGEN UR-MCNC: 0.2 MG/DL
WBC # BLD AUTO: 8.2 X10^3/UL (ref 4–11)
WBC #/AREA URNS HPF: (no result) /HPF (ref 0–4)

## 2021-12-20 PROCEDURE — 85025 COMPLETE CBC W/AUTO DIFF WBC: CPT

## 2021-12-20 PROCEDURE — 74177 CT ABD & PELVIS W/CONTRAST: CPT

## 2021-12-20 PROCEDURE — 81001 URINALYSIS AUTO W/SCOPE: CPT

## 2021-12-20 PROCEDURE — 87186 SC STD MICRODIL/AGAR DIL: CPT

## 2021-12-20 PROCEDURE — 99285 EMERGENCY DEPT VISIT HI MDM: CPT

## 2021-12-20 PROCEDURE — 96361 HYDRATE IV INFUSION ADD-ON: CPT

## 2021-12-20 PROCEDURE — 96365 THER/PROPH/DIAG IV INF INIT: CPT

## 2021-12-20 PROCEDURE — 83690 ASSAY OF LIPASE: CPT

## 2021-12-20 PROCEDURE — 96375 TX/PRO/DX INJ NEW DRUG ADDON: CPT

## 2021-12-20 PROCEDURE — 36415 COLL VENOUS BLD VENIPUNCTURE: CPT

## 2021-12-20 PROCEDURE — 87086 URINE CULTURE/COLONY COUNT: CPT

## 2021-12-20 PROCEDURE — 80053 COMPREHEN METABOLIC PANEL: CPT

## 2021-12-20 PROCEDURE — 87077 CULTURE AEROBIC IDENTIFY: CPT

## 2021-12-20 NOTE — RAD
INDICATION: Reason: Right sided pain / Spl. Instructions:  / History: 



COMPARISON: August 2018



TECHNIQUE: 



Axial CT images were obtained through the abdomen and pelvis with intravenous contrast.  



One or more of the following individualized dose reduction techniques were utilized for this examinat
ion:  1. Automated exposure control;  2. Adjustment of the mA and/or kV according to patient size;  3
. Use of iterative reconstruction technique.



FINDINGS:





Vascular: No abdominal aortic aneurysm.

Hepatobiliary: Subcentimeter low-density lesion of liver, too small to characterize but a common find
ing.

Pancreas: No peripancreatic edema.

Spleen: Spleen unremarkable.

Renal/Bladder: Minimal urine within the bladder limits evaluation. There are some regions of low dens
ity along the cortex of the right kidney with adjacent edema to the fat. There is also some prominent
 urothelial enhancement on the right.

Gastrointestinal: The partially visualized appendix does not appear grossly inflamed.

Small free fluid in the pelvis.

12 mm sclerotic lesion right anterior acetabulum most commonly from bone island.





IMPRESSION:



*   Regions of low density at the right renal cortex with adjacent edema to the fat as well as urothe
lial enhancement. Most likely cause would be pyelonephritis. Would correlate with infectious symptoms
. Alternative causes such as cortical infarct could also have this CT appearance but would be less li
kami in a patient of this age unless they have known history of vascular disease such as vasculitis. 
Follow-up could be obtained to ensure that this appropriately resolves to ensure there is no persiste
nt low-density lesion in the area.



Electronically signed by: Mikey Gordillo MD (12/20/2021 2:29 PM) [id number]

## 2021-12-20 NOTE — PHYS DOC
Past History


Past Medical History:  UTI, Other


Past Surgical History:  , Tonsillectomy, Tubal ligation


Smoking:  Quit Greater Than 1 Year


Alcohol Use:  Rarely


Drug Use:  None





General Adult


EDM:


Chief Complaint:  MULTIPLE COMPLAINTS





HPI:


HPI:


31-year-old female presents with right-sided abdominal pain.  She has had a 

cramping pain of moderate intensity for about 3 days.  It started out mild 

intermittent and is stronger and more frequent now.  Nothing seems to make it 

better or worse.  Patient does not eat very well due to poor dentition.  She 

states that her bowel movements are every few days and they have been normal 

lately.  She does believe she is a bit dehydrated and her urine is cloudy.  She 

denies fever or chills.  She has history of tubal ligation.





Review of Systems:


Review of Systems:


Constitutional:  Denies fever or chills 


Eyes:  Denies change in visual acuity 


HENT:  Denies nasal congestion or sore throat 


Respiratory:  Denies cough or shortness of breath 


Cardiovascular:  Denies chest pain or edema 


GI: Right-sided abdominal pain, nausea. Denies vomiting, bloody stools or 

diarrhea 


: Dysuria


Musculoskeletal:  Denies back pain or joint pain 


Integument:  Denies rash 


Neurologic:  Denies headache, focal weakness or sensory changes 


Endocrine:  Denies polyuria or polydipsia 


Lymphatic:  Denies swollen glands 


Psychiatric:  Denies depression or anxiety





Current Medications:


Current Meds:





Current Medications








 Medications


  (Trade)  Dose


 Ordered  Sig/Ming  Start Time


 Stop Time Status Last Admin


Dose Admin


 


 Sodium Chloride  1,000 ml @ 


 1,000 mls/hr  1X  ONCE  21 13:15


 21 14:14 UNV  














Allergies:


Allergies:





Allergies








Coded Allergies Type Severity Reaction Last Updated Verified


 


  Penicillins Allergy Unknown  21 Yes











Physical Exam:


PE:





Constitutional: Well developed, well nourished, no acute distress, non-toxic 

appearance. []


HENT: Normocephalic, atraumatic, bilateral external ears normal, oropharynx dry,

 no oral exudates, nose normal.  Poor dentition.  []


Eyes: PERRLA, EOMI, conjunctiva normal, no discharge. [] 


Neck: Normal range of motion, no tenderness, supple, no stridor. [] 


Cardiovascular: Heart rate regular rhythm, no murmur []


Lungs & Thorax:  Bilateral breath sounds clear to auscultation []


Abdomen: Bowel sounds normal, soft, right-sided and epigastric tenderness, no 

masses, no pulsatile masses. [] 


Skin: Warm, dry, no erythema, no rash. [] 


Back: No tenderness, no CVA tenderness. [] 


Extremities: No tenderness, no cyanosis, no clubbing, ROM intact, no edema. [] 


Neurologic: Alert and oriented X 3, normal motor function, normal sensory 

function, no focal deficits noted. []


Psychologic: Affect normal, judgement normal, mood normal. []





Current Patient Data:


Vital Signs:





                                   Vital Signs








  Date Time  Temp Pulse Resp B/P (MAP) Pulse Ox O2 Delivery O2 Flow Rate FiO2


 


21 11:55 98.9 110 22 126/84 (98) 99   











EKG:


EKG:


[]





Radiology/Procedures:


Radiology/Procedures:


[]


Impressions:


INDICATION: Reason: Right sided pain / Spl. Instructions:  / History: 





COMPARISON: 2018





TECHNIQUE: 





Axial CT images were obtained through the abdomen and pelvis with intravenous 

contrast.  





One or more of the following individualized dose reduction techniques were 

utilized for this examination:  1. Automated exposure control;  2. Adjustment of

 the mA and/or kV according to patient size;  3. Use of iterative reconstruction

 technique.





FINDINGS:








Vascular: No abdominal aortic aneurysm.


Hepatobiliary: Subcentimeter low-density lesion of liver, too small to 

characterize but a common finding.


Pancreas: No peripancreatic edema.


Spleen: Spleen unremarkable.


Renal/Bladder: Minimal urine within the bladder limits evaluation. There are 

some regions of low density along the cortex of the right kidney with adjacent 

edema to the fat. There is also some prominent urothelial enhancement on the 

right.


Gastrointestinal: The partially visualized appendix does not appear grossly 

inflamed.


Small free fluid in the pelvis.


12 mm sclerotic lesion right anterior acetabulum most commonly from bone island.








IMPRESSION:





*   Regions of low density at the right renal cortex with adjacent edema to the 

fat as well as urothelial enhancement. Most likely cause would be 

pyelonephritis. Would correlate with infectious symptoms. Alternative causes 

such as cortical infarct could also have this CT appearance but would be less 

likely in a patient of this age unless they have known history of vascular 

disease such as vasculitis. Follow-up could be obtained to ensure that this 

appropriately resolves to ensure there is no persistent low-density lesion in 

the area.





Electronically signed by: Juan C Gordillo MD (2021 2:29 PM) [id number]

[id number]














DICTATED AND SIGNED BY:     JUAN C GORDILLO MD


DATE:     21





CC: FERMÍN FISCHER DO; PCP,NO ~MTH0 0





Heart Score:


C/O Chest Pain:  N/A


Risk Factors:


Risk Factors:  DM, Current or recent (<one month) smoker, HTN, HLP, family 

history of CAD, obesity.


Risk Scores:


Score 0 - 3:  2.5% MACE over next 6 weeks - Discharge Home


Score 4 - 6:  20.3% MACE over next 6 weeks - Admit for Clinical Observation


Score 7 - 10:  72.7% MACE over next 6 weeks - Early Invasive Strategies





Course & Med Decision Making:


Course & Med Decision Making


Pertinent Labs and Imaging studies reviewed. (See chart for details)


The patient's labs are unremarkable except for mild anemia.  Her urinalysis 

significant for urinary tract infection.  CT the abdomen and pelvis shows 

pyelonephritis.  She does not have a fever.  We have given her 2 L of normal 

saline and a gram of Rocephin IV.  I will discharge the patient on 7 days of 

levofloxacin.  She is stable for discharge at this time.


[]





Dragon Disclaimer:


Dragon Disclaimer:


This electronic medical record was generated, in whole or in part, using a voice

 recognition dictation system.





Departure


Departure:


Impression:  


   Primary Impression:  


   Acute pyelonephritis


Disposition:   HOME / SELF CARE / HOMELESS


Condition:  STABLE


Referrals:  


PCP,NO (PCP)


Patient Instructions:  Pyelonephritis, Adult, Easy-to-Read


Scripts


Levofloxacin (LEVOFLOXACIN) 750 Mg Tablet


1 TAB PO DAILY for pyelonephritis for 7 Days, #7 TAB


   Prov: FERMÍN FISCHER DO         21











FERMÍN FISCHER DO                 Dec 20, 2021 13:12

## 2021-12-21 ENCOUNTER — HOSPITAL ENCOUNTER (EMERGENCY)
Dept: HOSPITAL 63 - ER | Age: 31
Discharge: LEFT BEFORE DISCHARGE | End: 2021-12-21
Payer: MEDICAID

## 2021-12-21 VITALS — DIASTOLIC BLOOD PRESSURE: 77 MMHG | SYSTOLIC BLOOD PRESSURE: 124 MMHG

## 2021-12-21 VITALS — BODY MASS INDEX: 26.94 KG/M2 | HEIGHT: 62 IN | WEIGHT: 146.39 LBS

## 2021-12-21 DIAGNOSIS — Z88.0: ICD-10-CM

## 2021-12-21 DIAGNOSIS — N12: Primary | ICD-10-CM

## 2021-12-21 DIAGNOSIS — Z87.891: ICD-10-CM

## 2021-12-21 PROCEDURE — 99283 EMERGENCY DEPT VISIT LOW MDM: CPT

## 2021-12-21 PROCEDURE — 96372 THER/PROPH/DIAG INJ SC/IM: CPT

## 2021-12-21 NOTE — PHYS DOC
Past History


Past Medical History:  UTI, Other


 (BRADY KHAN)


Past Surgical History:  , Tonsillectomy, Tubal ligation


 (BRADY KHAN)


Smoking:  Quit Greater Than 1 Year


Alcohol Use:  Rarely


Drug Use:  None


 (BRADY KHAN)





General Adult


EDM:


Chief Complaint:  ABDOMINAL PAIN





HPI:


HPI:





Patient is a 31 year old female who turns to the emergency department with 

complaints of abdominal pain and flank pain.  She was seen here in the 

department yesterday and diagnosed with pyelonephritis.  She was prescribed 

levofloxacin.  She states she has taken her antibiotic today.  Patient reports 

she also had an Excedrin around noon.  She denies any change in symptoms.  Her 

complaint today is that she continues to have abdominal and flank pain.


 (BRADY KHAN)





Review of Systems:


Review of Systems:


12 systems reviewed.  ROS negative except as mentioned in HPI.


 (BRADY KHAN)





Current Medications:


Current Meds:





Current Medications








 Medications


  (Trade)  Dose


 Ordered  Sig/Ming  Start Time


 Stop Time Status Last Admin


Dose Admin


 


 Ketorolac


 Tromethamine


  (Toradol Im)  60 mg  1X  ONCE  21 21:15


 21 21:16 UNV  











 (BRADY KHAN)





Allergies:


Allergies:





Allergies








Coded Allergies Type Severity Reaction Last Updated Verified


 


  Penicillins Allergy Unknown  21 Yes








 (BRADY KHAN)





Physical Exam:


PE:





Constitutional: Well developed, well nourished, patient appears to be in pain, 

nontoxic appearance.


Cardiovascular: Heart rate regular rhythm, no murmur.


Lungs & Thorax: Bilateral breath sounds clear to auscultation.


Abdomen: Bowel sounds normal, soft, no tenderness, no masses, no pulsatile 

masses. 


Back: No step-off, no midline tenderness, no paraspinal tenderness, right-sided 

CVA tenderness appreciated.


 (BRADY KHAN)





Current Patient Data:


Vital Signs:





                                   Vital Signs








  Date Time  Temp Pulse Resp B/P (MAP) Pulse Ox O2 Delivery O2 Flow Rate FiO2


 


21 20:26 98.2 112 16 124/77 (93) 100 Room Air  








 (BRADY KHAN)





Heart Score:


C/O Chest Pain:  No


 (BRADY KHAN)





Course & Med Decision Making:


Course & Med Decision Making


Pertinent Labs and Imaging studies reviewed. (See chart for details)





Patient is a 31-year-old female who was diagnosed with pyelonephritis in the 

emergency department yesterday.  She presents today because she is still in 

pain.  Patient will be given Toradol IM.





Patient was not satisfied with nursing care in the department and asked for new 

nurse. A separate nurse in the department administered her toradol. When I went 

in to reevaluate the patient and let her know that the department is busy and we

are doing our best to treat everyone in a timely manner, and asked if her pain 

had improved.  She was unsatisfied with how I spoke to her and decided to leave 

AGAINST MEDICAL ADVICE. I verbalized to the patient that should she leave, there

is risk of worsening of symptoms, permanent disability and death. Patient 

verbalized understanding but refused to sign paperwork. 


 (BRADY KHAN)


Course & Med Decision Making


I was the Attending physician on the above date of service of this patient. This

patient was evaluated, examined, treated, and dispositioned from the emergency 

department by the mid-level practitioner.  Although I was working at the time , 

no assistance was requested. 





Electronically signed, Ion Mercer DO


 (ION MECRER DO)


Kyle Disclaimer:


Dragon Disclaimer:


This electronic medical record was generated, in whole or in part, using a voice

recognition dictation system.


 (BRADY KHAN)





Departure


Departure:


Impression:  


   Primary Impression:  


   Left against medical advice


   Additional Impression:  


   Pyelonephritis


Disposition:  07 LEFT AGAINST MEDICAL ADVICE


Condition:  GUARDED


Referrals:  


PCP,NO (PCP)











BRADY KHAN              Dec 21, 2021 21:21


ION MERCER DO                 Dec 22, 2021 01:35